# Patient Record
(demographics unavailable — no encounter records)

---

## 2025-02-06 NOTE — HISTORY OF PRESENT ILLNESS
[de-identified] : Patient is here for follow-up on her left knee she is 6 weeks from a fall directly on her knee  Physical exam left knee she is able to straight leg raise with no problems, no effusion, ligaments are stable, she is slightly tender over the patella tendon nontender over the patella  She will get back to her work activities February 10 full duty at her old position we will see her back in 4 to 6 weeks if needed